# Patient Record
Sex: FEMALE | Race: BLACK OR AFRICAN AMERICAN | NOT HISPANIC OR LATINO | URBAN - METROPOLITAN AREA
[De-identification: names, ages, dates, MRNs, and addresses within clinical notes are randomized per-mention and may not be internally consistent; named-entity substitution may affect disease eponyms.]

---

## 2020-05-12 ENCOUNTER — OFFICE (OUTPATIENT)
Dept: URBAN - METROPOLITAN AREA TELEHEALTH 3 | Facility: TELEHEALTH | Age: 35
End: 2020-05-12

## 2020-05-12 VITALS — WEIGHT: 293 LBS | HEIGHT: 64 IN

## 2020-05-12 DIAGNOSIS — E66.01 MORBID (SEVERE) OBESITY DUE TO EXCESS CALORIES: ICD-10-CM

## 2020-05-12 DIAGNOSIS — K21.0 GASTRO-ESOPHAGEAL REFLUX DISEASE WITH ESOPHAGITIS: ICD-10-CM

## 2020-05-12 PROCEDURE — 99203 OFFICE O/P NEW LOW 30 MIN: CPT | Performed by: INTERNAL MEDICINE

## 2020-05-12 RX ORDER — FAMOTIDINE 20 MG/1
TABLET, FILM COATED ORAL
Qty: 120 | Refills: 3 | Status: ACTIVE

## 2020-05-12 NOTE — SERVICEHPINOTES
PATIENT VERIFIED BY DATE OF BIRTH AND NAME. Patient has been consented for this telecommunication visit.   36 yo woman with GERD symptoms which have been severe since her diagnosis of DM 2 yrs ago. She is morbidly obese and has BMI of 63 at this time. Her weight has been stable but not going down since her diagnosis. She went to urgent care for primary care and prescribed omeprazole which helped, but has been taking OTC Prilosec and now famotidine 1 tab daily with poor control of her symptoms. She has not had a DEXA or gastric emptying scan. Was denied bariatric surgery coverage by insurance so not pursuing this at this time. Denies any bleeding, nausea, vomiting, dysphagia/odynophagia or bowel habit changes.

## 2020-05-28 ENCOUNTER — ON CAMPUS - OUTPATIENT (OUTPATIENT)
Dept: URBAN - METROPOLITAN AREA HOSPITAL 16 | Facility: HOSPITAL | Age: 35
End: 2020-05-28

## 2020-05-28 DIAGNOSIS — K29.60 OTHER GASTRITIS WITHOUT BLEEDING: ICD-10-CM

## 2020-05-28 DIAGNOSIS — K21.0 GASTRO-ESOPHAGEAL REFLUX DISEASE WITH ESOPHAGITIS: ICD-10-CM

## 2020-05-28 PROCEDURE — 43239 EGD BIOPSY SINGLE/MULTIPLE: CPT | Performed by: INTERNAL MEDICINE
